# Patient Record
Sex: FEMALE | Race: ASIAN
[De-identification: names, ages, dates, MRNs, and addresses within clinical notes are randomized per-mention and may not be internally consistent; named-entity substitution may affect disease eponyms.]

---

## 2017-04-12 ENCOUNTER — HOSPITAL ENCOUNTER (EMERGENCY)
Dept: HOSPITAL 25 - ED | Age: 22
Discharge: HOME | End: 2017-04-12
Payer: MEDICAID

## 2017-04-12 VITALS — SYSTOLIC BLOOD PRESSURE: 109 MMHG | DIASTOLIC BLOOD PRESSURE: 75 MMHG

## 2017-04-12 DIAGNOSIS — R11.2: ICD-10-CM

## 2017-04-12 DIAGNOSIS — R19.7: ICD-10-CM

## 2017-04-12 DIAGNOSIS — R10.9: Primary | ICD-10-CM

## 2017-04-12 LAB
ALBUMIN SERPL BCG-MCNC: 4.2 G/DL (ref 3.2–5.2)
ALP SERPL-CCNC: 106 U/L (ref 34–104)
ALT SERPL W P-5'-P-CCNC: 25 U/L (ref 7–52)
AMYLASE SERPL-CCNC: 40 U/L (ref 29–103)
ANION GAP SERPL CALC-SCNC: 8 MMOL/L (ref 2–11)
AST SERPL-CCNC: 24 U/L (ref 13–39)
BUN SERPL-MCNC: 7 MG/DL (ref 6–24)
BUN/CREAT SERPL: 13.7 (ref 8–20)
CALCIUM SERPL-MCNC: 9.3 MG/DL (ref 8.6–10.3)
CHLORIDE SERPL-SCNC: 105 MMOL/L (ref 101–111)
GLOBULIN SER CALC-MCNC: 3.8 G/DL (ref 2–4)
GLUCOSE SERPL-MCNC: 106 MG/DL (ref 70–100)
HCO3 SERPL-SCNC: 20 MMOL/L (ref 22–32)
HCT VFR BLD AUTO: 35 % (ref 35–47)
HGB BLD-MCNC: 10.4 G/DL (ref 12–16)
LIPASE SERPL-CCNC: 31 U/L (ref 11–82)
MAGNESIUM SERPL-MCNC: 1.7 MG/DL (ref 1.9–2.7)
MCH RBC QN AUTO: 20 PG (ref 27–31)
MCHC RBC AUTO-ENTMCNC: 30 G/DL (ref 31–36)
MCV RBC AUTO: 67 FL (ref 80–97)
POTASSIUM SERPL-SCNC: 4.2 MMOL/L (ref 3.5–5)
PROT SERPL-MCNC: 8 G/DL (ref 6.4–8.9)
RBC # BLD AUTO: 5.2 10^6/UL (ref 4–5.4)
SODIUM SERPL-SCNC: 133 MMOL/L (ref 133–145)
WBC # BLD AUTO: 13.3 10^3/UL (ref 3.5–10.8)

## 2017-04-12 PROCEDURE — 82150 ASSAY OF AMYLASE: CPT

## 2017-04-12 PROCEDURE — 36415 COLL VENOUS BLD VENIPUNCTURE: CPT

## 2017-04-12 PROCEDURE — 83735 ASSAY OF MAGNESIUM: CPT

## 2017-04-12 PROCEDURE — 99283 EMERGENCY DEPT VISIT LOW MDM: CPT

## 2017-04-12 PROCEDURE — 86140 C-REACTIVE PROTEIN: CPT

## 2017-04-12 PROCEDURE — 85025 COMPLETE CBC W/AUTO DIFF WBC: CPT

## 2017-04-12 PROCEDURE — 86141 C-REACTIVE PROTEIN HS: CPT

## 2017-04-12 PROCEDURE — 74020: CPT

## 2017-04-12 PROCEDURE — 84702 CHORIONIC GONADOTROPIN TEST: CPT

## 2017-04-12 PROCEDURE — 86703 HIV-1/HIV-2 1 RESULT ANTBDY: CPT

## 2017-04-12 PROCEDURE — 81003 URINALYSIS AUTO W/O SCOPE: CPT

## 2017-04-12 PROCEDURE — 83690 ASSAY OF LIPASE: CPT

## 2017-04-12 PROCEDURE — 80053 COMPREHEN METABOLIC PANEL: CPT

## 2017-04-12 NOTE — ED
Richelle CONTRERAS Matthew, scribed for Fer Camargo MD on 04/12/17 at 0854 .





Abdominal Pain/Female





- HPI Summary


HPI Summary: 


A 23 y/o female presents to the ED with sudden, intermittent, diffuse abdominal 

pain since 03:00 this morning. The pain is rated 9/10 in severity and radiates 

to the back. Associated symptoms include vomiting - 1x, nausea, and diarrhea - 

8x. The patient denies fever, chills, recent travel, and recent Abx. The pain 

alleviates after a BM. LNMP - 3/25/17. 














- History of Current Complaint


Chief Complaint: EDAbdPain


Stated Complaint: ABD PAIN


Time Seen by Provider: 04/12/17 08:44


Hx Obtained From: Patient


Pregnant?: No


Onset/Duration: Sudden Onset, Lasting Hours, Still Present


Timing: Constant


Severity Initially: Moderate


Severity Currently: Moderate


Pain Intensity: 9


Pain Scale Used: 0-10 Numeric


Location: Diffuse


Radiates: Yes


Radiates to: Back


Alleviating Factor(s): Bowel Movement


Associated Signs and Symptoms: Positive: Nausea, Vomiting, Diarrhea.  Negative: 

Fever


Allergies/Adverse Reactions: 


 Allergies











Allergy/AdvReac Type Severity Reaction Status Date / Time


 


Diphenhydramine Allergy  Hives Verified 04/12/17 08:38





[From Benadryl]     














PMH/Surg Hx/FS Hx/Imm Hx


Endocrine/Hematology History: Reports: Hx Thyroid Disease


 History: Reports: Hx Kidney Infection - hx nephrotic disorder


Psychiatric History: Reports: Hx Depression, Other Psychiatric Issues/Disorders 

- hx of cutting





- Surgical History


Surgery Procedure, Year, and Place: Appendectomy


Infectious Disease History: No


Infectious Disease History: 


   Denies: Traveled Outside the [place] in Last 30 Days





- Family History


Known Family History: Positive: Cardiac Disease





- Social History


Alcohol Use: None


Substance Use Type: Reports: None


Smoking Status (MU): Never Smoked Tobacco





Review of Systems


Constitutional: Negative


Negative: Fever, Chills


Eyes: Negative


ENT: Negative


Cardiovascular: Negative


Respiratory: Negative


Positive: Abdominal Pain - Diffuse, Vomiting, Diarrhea, Nausea


Genitourinary: Negative


Musculoskeletal: Negative


Skin: Negative


Neurological: Negative


Psychological: Normal


All Other Systems Reviewed And Are Negative: Yes





Physical Exam





- Summary


Physical Exam Summary: 





VITAL SIGNS: Reviewed. 


GENERAL: Patient is an obese female  who is in some distress secondary to pain. 

Patient is not in any acute respiratory distress. 


HEAD AND FACE: Normocephalic and atraumatic. 


EYES: PERRLA, EOMI x 2, No injected conjunctiva.


EARS: Hearing grossly intact. Ear canals and tympanic membranes are WNL.


MOUTH: Oropharynx within normal limits. 


NECK: Supple, trachea is midline, no adenopathy, no JVD.


CHEST: Symmetric, no tenderness at palpation 


LUNGS: Clear to auscultation bilaterally. No wheezing or crackles.


CVS: RRR,, S1 and S2 present, no murmurs or gallops appreciated. 


ABDOMEN: Soft, positive diffuse tenderness. . No signs of distention. Positive 

bowel sounds. No rebound no guarding, and no masses palpated. No abdominal 

bruit or pulsations. 


EXTREMITIES: FROM in all major joints, no edema, no cyanosis or clubbing.


NEURO: Alert and oriented x 3. No acute neurological deficits. Speech is normal.


SKIN: Dry and warm





Triage Information Reviewed: Yes


Vital Signs On Initial Exam: 


 Initial Vitals











Temp Pulse Resp BP Pulse Ox


 


 97.6 F   82   16   137/86   100 


 


 04/12/17 08:39  04/12/17 08:39  04/12/17 08:39  04/12/17 08:39  04/12/17 08:39











Vital Signs Reviewed: Yes





Diagnostics





- Vital Signs


 Vital Signs











  Temp Pulse Resp BP Pulse Ox


 


 04/12/17 08:39  97.6 F  82  16  137/86  100














- Laboratory


Result Diagrams: 


 04/12/17 08:50





 04/12/17 08:50


Lab Statement: Any lab studies that have been ordered have been reviewed, and 

results considered in the medical decision making process.





- Radiology


  ** Abdominal XR


Xray Interpretation: No Acute Changes - IMPRESSION:  NONOBSTRUCTIVE BOWEL GAS 

PATTERN.


Radiology Interpretation Completed By: Radiologist





Abdominal Pain Fem Course/Dx





- Course


Course Of Treatment: A 23 y/o female presents to the ED with sudden, 

intermittent, diffuse abdominal pain since 03:00 this morning. The pain is 

rated 9/10 in severity and radiates to the back. Associated symptoms include 

vomiting - 1x, nausea, and diarrhea - 8x. The patient denies fever, chills, 

recent travel, and recent Abx. The pain alleviates after a BM. LNMP - 3/25/17.  

Blood work shows WBC of 13.3, Hgb of 10.4 no bands, CO2 of 20, glucose of 106, c

-reactive protein of 15.05 , magnesium of 1.7, which she was given PO 

magnesium.  Urine analysis negative. Abd XR shows nonobstructive bowel gas 

pattern. In the ED course, the patient was given IV fluids, Zofran, and Toradol 

for pain. After the patient was hydrate and given the above medications, her 

symptom shave improved. At this point, the patient is no longer having nausea 

and the abdominal pain has subsided. She also reports that while she was here 

she went to the bathroom and had cramping before, but after her cramping 

alleviated. Since the patient is asymptomatic she will be discharged home 

without a CT A/P, because at this point I do not believe it is warranted. The 

patient will be discharged home on Zofran and Bentyl. She will follow-up with 

her PCP. She is hemodynamically stable and A&Ox3.  I discussed all the findings 

and test results with the patient. Patient was instructed to return to the 

emergency room immediately if any of the symptoms return or worsens. Plan of 

care was discussed with the patient and understands and agrees. All questions 

were answered at patient satisfaction.  There were no further complaints or 

concerns.  Lung exam before discharge: CTA B/L. Good air exchange. No wheezing 

or crackles heard. CVS: S1 and S2 present. No murmurs appreciated. Patient is 

alert and oriented x 3. Patient is hemodynamically stable. Patient will be 

discharged home with follow up PCP in the next 2-3 days





- Diagnoses


Differential Diagnosis: Positive: Other - Gastritis, gastroenteitis, Colitis


Provider Diagnoses: 


 Abdominal pain, Nausea & vomiting








Discharge





- Discharge Plan


Condition: Stable


Disposition: HOME


Prescriptions: 


Dicyclomine CAP* [Bentyl CAP*] 10 mg PO TID PRN #10 cap


 PRN Reason: Pain


Ondansetron ODT TAB* [Zofran 4 MG Odt TAB*] 4 mg PO Q6H PRN #10 tab.odt


 PRN Reason: Vomiting


Patient Education Materials:  Dicyclomine (By mouth), Ondansetron (By mouth), 

Acute Nausea and Vomiting (ED), Acute Diarrhea (ED), Abdominal Pain (ED)


Referrals: 


[facility] [place] PHYSICIAN REFERRAL [Outside]


Additional Instructions: 


Please follow-up with your primary care physician in 2 days. 





The documentation as recorded by the Richelle rooney Matthew accurately 

reflects the service I personally performed and the decisions made by me, Fer Camargo MD.

## 2017-04-12 NOTE — RAD
HISTORY:  Abdominal pain



COMPARISONS: CT dated December 26, 2016



VIEWS: Frontal supine and upright views of the abdomen.



FINDINGS: 

BOWEL: There is a nonobstructive bowel gas pattern. There is a moderate amount of stool

within the colon.

CALCULI: There are no abnormal calculi.

BONES AND SOFT TISSUES: There are no osseous abnormalities.

OTHER FINDINGS: The lung bases are clear. There is no subphrenic gas.



IMPRESSION: 

NONOBSTRUCTIVE BOWEL GAS PATTERN.

## 2017-09-21 ENCOUNTER — HOSPITAL ENCOUNTER (EMERGENCY)
Dept: HOSPITAL 25 - ED | Age: 22
Discharge: HOME | End: 2017-09-21
Payer: COMMERCIAL

## 2017-09-21 VITALS — SYSTOLIC BLOOD PRESSURE: 102 MMHG | DIASTOLIC BLOOD PRESSURE: 77 MMHG

## 2017-09-21 DIAGNOSIS — L03.211: ICD-10-CM

## 2017-09-21 DIAGNOSIS — H92.09: ICD-10-CM

## 2017-09-21 DIAGNOSIS — H66.93: Primary | ICD-10-CM

## 2017-09-21 DIAGNOSIS — R11.0: ICD-10-CM

## 2017-09-21 DIAGNOSIS — J32.9: ICD-10-CM

## 2017-09-21 DIAGNOSIS — R50.9: ICD-10-CM

## 2017-09-21 DIAGNOSIS — R05: ICD-10-CM

## 2017-09-21 PROCEDURE — 99282 EMERGENCY DEPT VISIT SF MDM: CPT

## 2017-09-21 NOTE — ED
Karlo CONTRERAS Benjamin, scribed for Isaiah Leavitt MD on 09/21/17 at 2023 .





HPI Febrile Illness





- HPI Summary


HPI Summary: 





23yo female c/o fever at nights for 6 days. Pt reports temp of 101F. Pt also 

reports right sided facial swelling, right sided hearing loss, sinus pressure, 

chest congestion, and cough with yellow phlegm sometimes, and nausea. Denies V/

D. Pt has been taking Tylenol and NyQuil for her symptoms, but they only 

provide temporary relief and symptoms have been recurring the next day. Hx of 

chronic nephrotic syndrome.





- History of Current Complaint


Chief Complaint: EDFever


Hx Obtained From: Patient


Onset/Duration: Started Days Ago - 6 days ago, Still Present


Timing: Intermittent


Initial Severity: Mild


Current Severity: Moderate


Pain Intensity: 7


Pain Scale Used: 0-10 Numeric


Aggravating Factors: Nothing


Alleviating Factors: Nothing


Associated Signs and Symptoms: Cough, Nausea, Swelling - right face





- Allergy/Home Medications


Allergies/Adverse Reactions: 


 Allergies











Allergy/AdvReac Type Severity Reaction Status Date / Time


 


Diphenhydramine Allergy  Hives Verified 09/21/17 19:13





[From Benadryl]     














PMH/Surg Hx/FS Hx/Imm Hx


Endocrine/Hematology History: Reports: Hx Thyroid Disease


 History: Reports: Hx Kidney Infection - hx nephrotic disorder


Psychiatric History: Reports: Hx Depression, Other Psychiatric Issues/Disorders 

- hx of cutting





- Surgical History


Surgery Procedure, Year, and Place: Appendectomy


Infectious Disease History: No


Infectious Disease History: 


   Denies: Traveled Outside the [place] in Last 30 Days





- Family History


Known Family History: Positive: Cardiac Disease


   Negative: Hypertension





- Social History


Occupation: Unemployed


Lives: With Family


Alcohol Use: None


Substance Use Type: Reports: None


Smoking Status (MU): Never Smoked Tobacco





Review of Systems


Positive: Fever


Eyes: Negative


Positive: Ear Ache


Cardiovascular: Negative


Negative: Chest Pain


Positive: Cough.  Negative: Shortness Of Breath


Positive: Nausea.  Negative: Abdominal Pain, Vomiting, Diarrhea


Genitourinary: Negative


Positive: no symptoms reported


Musculoskeletal: Negative


Skin: Negative


Neurological: Negative


Psychological: Normal


All Other Systems Reviewed And Are Negative: Yes





Physical Exam





- Summary


Physical Exam Summary: 








The patient is well-nourished in no acute distress and in no acute pain.





The skin is warm and dry and skin color reflects adequate perfusion. There is 

cellulitis looking lesion in right neck, without any erythema. Area is not hot 

to touch.  





HEENT:  The head is normocephalic and atraumatic. The pupils are equal and 

reactive. The conjunctivae are clear and without drainage.  Nares are patent 

and without drainage.  Mouth reveals moist mucous membranes and the throat is 

without erythema and exudate.  The external ears are intact. The ear canals are 

patent and without drainage. The bilateral tympanic membranes are bulging and 

erythematous. Teeth are intact with no percussion tenderness. 





Neck is supple with full range of motion and non-tender. There are no carotid 

bruits.  There is no neck vein distension. There are some right sided face and 

neck swelling. 





Respiratory: Chest is non-tender.  Lungs are clear to auscultation and breath 

sounds are symmetrical and equal.





Cardiovascular: Hear is tachycardic, but regular rhythm.  There is no murmur or 

rub auscultated.   There is no peripheral edema and pulses are symmetrical and 

equal.





Abdomen: The abdomen is obese, soft and non-tender.  There are normal bowel 

sounds heard in all four quadrants and there is no organomegaly palpated.





Musculoskeletal: There is no back pain noted.  Extremities are non-tender with 

full range of motion.  There is good capillary refill.  There is no peripheral 

edema or calf tenderness elicited.





Neurological: Patient is alert and oriented to person, place and time.  The 

patient has symmetrical motor strength in all four extremities.  Cranial nerves 

are grossly intact. Deep tendon reflexes are symmetrical and equal in all four 

extremities.





Psychiatric: The patient has an appropriate affect and does not exhibit any 

anxiety or depression. 








Triage Information Reviewed: Yes


Vital Signs On Initial Exam: 


 Initial Vitals











Temp Pulse Resp BP Pulse Ox


 


 99.0 F   122   16   102/77   98 


 


 09/21/17 19:08  09/21/17 19:08  09/21/17 19:08  09/21/17 19:08  09/21/17 19:08











Vital Signs Reviewed: Yes





Diagnostics





- Vital Signs


 Vital Signs











  Temp Pulse Resp BP Pulse Ox


 


 09/21/17 19:08  99.0 F  122  16  102/77  98














- Laboratory


Lab Statement: Any lab studies that have been ordered have been reviewed, and 

results considered in the medical decision making process.





Course/Dx





- Course


Course Of Treatment: Reviewed pt's medication list and allergies. Blood 

pressure noted.





- Febrile Illness


Differential Diagnoses: Cellulitis, Other: - dental infection, otitis media, 

bronchitis





- Diagnoses


Provider Diagnoses: 


 Bilateral acute otitis media, Sinusitis, Facial cellulitis








Discharge





- Discharge Plan


Condition: Stable


Disposition: HOME


Prescriptions: 


Amoxicillin/Clavulanate TAB* [Augmentin *] 875 mg PO BID #20 tab


HYDROcodone/ACETAMIN 5-325 MG* [Norco 5-325 TAB*] 1 tab PO Q6H PRN #20 tab MDD 4


 PRN Reason: pain


Patient Education Materials:  Hydrocodone/Acetaminophen (By mouth), Amoxicillin/

Clavulanate Potassium (By mouth), Cellulitis (ED), Sinusitis (ED), Otitis Media 

(ED)


Referrals: 


[facility] [place] PHYSICIAN REFERRAL [Outside]





The documentation as recorded by the Karlo rooney Benjamin accurately reflects 

the service I personally performed and the decisions made by me, Isaiah Leavitt MD.

## 2017-10-03 ENCOUNTER — HOSPITAL ENCOUNTER (EMERGENCY)
Dept: HOSPITAL 25 - ED | Age: 22
Discharge: LEFT BEFORE DISCHARGE | End: 2017-10-03
Payer: COMMERCIAL

## 2017-10-03 VITALS — SYSTOLIC BLOOD PRESSURE: 95 MMHG | DIASTOLIC BLOOD PRESSURE: 79 MMHG

## 2017-10-03 DIAGNOSIS — R21: Primary | ICD-10-CM

## 2017-10-03 DIAGNOSIS — Z53.21: ICD-10-CM

## 2018-10-20 ENCOUNTER — HOSPITAL ENCOUNTER (EMERGENCY)
Dept: HOSPITAL 25 - ED | Age: 23
Discharge: HOME | End: 2018-10-20
Payer: COMMERCIAL

## 2018-10-20 VITALS — DIASTOLIC BLOOD PRESSURE: 74 MMHG | SYSTOLIC BLOOD PRESSURE: 118 MMHG

## 2018-10-20 DIAGNOSIS — N89.8: ICD-10-CM

## 2018-10-20 DIAGNOSIS — N39.0: Primary | ICD-10-CM

## 2018-10-20 LAB — WBC UR QL AUTO: (no result)

## 2018-10-20 PROCEDURE — 87591 N.GONORRHOEAE DNA AMP PROB: CPT

## 2018-10-20 PROCEDURE — 81003 URINALYSIS AUTO W/O SCOPE: CPT

## 2018-10-20 PROCEDURE — 87086 URINE CULTURE/COLONY COUNT: CPT

## 2018-10-20 PROCEDURE — 81015 MICROSCOPIC EXAM OF URINE: CPT

## 2018-10-20 PROCEDURE — 87491 CHLMYD TRACH DNA AMP PROBE: CPT

## 2018-10-20 PROCEDURE — 87510 GARDNER VAG DNA DIR PROBE: CPT

## 2018-10-20 PROCEDURE — 99283 EMERGENCY DEPT VISIT LOW MDM: CPT

## 2018-10-20 PROCEDURE — 87480 CANDIDA DNA DIR PROBE: CPT

## 2018-10-20 PROCEDURE — 87661 TRICHOMONAS VAGINALIS AMPLIF: CPT

## 2018-10-20 NOTE — ED
GI/ HPI





- HPI Summary


HPI Summary: 


23-year-old female presents with vaginal itching for the past couple days.  She 

states she thinks she has a yeast infection.  She states that she tried 

Monistat without relief.  She states that she is also been having dysuria.  She 

states the burning is worst when she pees.  She denies any fevers.  No nausea 

and vomiting.  No diarrhea or constipation.  She states she gets occasional 

pelvic pain.   Denies any chance STDs.  is on nexplanon. 





- History of Current Complaint


Chief Complaint: EDOBProblems


Time Seen by Provider: 10/20/18 18:31


Stated Complaint: VAGINAL PAIN


Pain Intensity: 0





- Allergy/Home Medications


Allergies/Adverse Reactions: 


 Allergies











Allergy/AdvReac Type Severity Reaction Status Date / Time


 


diphenhydramine Allergy  Hives Verified 10/20/18 18:29





[From Benadryl Allergy]     














PMH/Surg Hx/FS Hx/Imm Hx


Endocrine/Hematology History: Reports: Hx Thyroid Disease


Cardiovascular History: 


   Denies: Hx Hypertension


 History: Reports: Hx Kidney Infection - hx nephrotic disorder


Psychiatric History: Reports: Hx Depression, Other Psychiatric Issues/Disorders 

- hx of cutting





- Surgical History


Surgery Procedure, Year, and Place: Appendectomy


Infectious Disease History: No


Infectious Disease History: 


   Denies: Traveled Outside the [place] in Last 30 Days





- Family History


Known Family History: Positive: Cardiac Disease, Diabetes


   Negative: Hypertension





- Social History


Alcohol Use: Occasionally


Substance Use Type: Reports: None


Smoking Status (MU): Never Smoked Tobacco





Review of Systems


Negative: Fever


Negative: Chest Pain


Negative: Shortness Of Breath


Positive: Abdominal Pain


Positive: dysuria, other - vaginal discharge


All Other Systems Reviewed And Are Negative: Yes





Physical Exam


Triage Information Reviewed: Yes


Vital Signs On Initial Exam: 


 Initial Vitals











Temp Pulse Resp BP Pulse Ox


 


 98.2 F   80   16   121/82   100 


 


 10/20/18 18:27  10/20/18 18:27  10/20/18 18:27  10/20/18 18:27  10/20/18 18:27











Vital Signs Reviewed: Yes


Appearance: Positive: Well-Appearing


Skin: Positive: Warm, Dry


Head/Face: Positive: Normal Head/Face Inspection


Eyes: Positive: Normal, Conjunctiva Clear


ENT: Positive: Pharynx normal


Respiratory/Lung Sounds: Positive: Clear to Auscultation, Breath Sounds Present


Cardiovascular: Positive: Normal, RRR


Abdomen Description: Positive: Soft, Other: - tenderness suprapubic.  Negative: 

CVA Tenderness (R), CVA Tenderness (L)


Pelvic Exam: Positive: External Exam Normal, No Cerv. Motion Tender, Discharge 

- white


Musculoskeletal: Positive: Normal


Neurological: Positive: Normal


Psychiatric: Positive: Normal





Diagnostics





- Vital Signs


 Vital Signs











  Temp Pulse Resp BP Pulse Ox


 


 10/20/18 18:27  98.2 F  80  16  121/82  100














- Laboratory


Lab Results: 


 Lab Results











  10/20/18 Range/Units





  19:29 


 


Urine Color  Straw  


 


Urine Appearance  Clear  


 


Urine pH  7.0  (5-9)  


 


Ur Specific Gravity  1.010  (1.010-1.030)  


 


Urine Protein  Negative  (Negative)  


 


Urine Ketones  Negative  (Negative)  


 


Urine Blood  Negative  (Negative)  


 


Urine Nitrate  Negative  (Negative)  


 


Urine Bilirubin  Negative  (Negative)  


 


Urine Urobilinogen  Negative  (Negative)  


 


Ur Leukocyte Esterase  2+ A  (Negative)  


 


Urine WBC (Auto)  Trace(0-5/hpf)  (Absent)  


 


Urine RBC (Auto)  Absent  (Absent)  


 


Ur Squamous Epith Cells  Present A  (Absent)  


 


Urine Bacteria  Absent  (Absent)  


 


Urine Glucose  Negative  (Negative)  











Lab Statement: Any lab studies that have been ordered have been reviewed, and 

results considered in the medical decision making process.





GIGU Course/Dx





- Course


Course Of Treatment: 23-year-old female presents with vaginal itching for the 

past couple days.  She states she thinks she has a yeast infection.  She states 

that she tried Monistat without relief.  She states that she is also been 

having dysuria.  She states the burning is worst when she pees.  She denies any 

fevers.  No nausea and vomiting.  No diarrhea or constipation.  She states she 

gets occasional pelvic pain.   Denies any chance STDs.  On exam has some 

suprapubic tenderness.  Has no cervical motion tenderness.  white discharge is 

present.  Will treat this potential yeast infection with Diflucan.  Will place 

on Macrobid for UTI.  Will wait for final vaginal cultures to make sure was 

just a yeast infection.  Gave dose of Diflucan here.  Patient understands 

agrees with plan.





- Diagnoses


Differential Diagnoses - Female: STD, Urinary Tract Infection, Vaginitis


Provider Diagnoses: 


 UTI (urinary tract infection), Vaginal discharge








Discharge





- Sign-Out/Discharge


Documenting (check all that apply): Patient Departure





- Discharge Plan


Condition: Good


Disposition: HOME


Prescriptions: 


Nitrofurantoin Monohyd/M-Cryst [Macrobid 100 mg Capsule] 100 mg PO BID #13 cap


Patient Education Materials:  Urinary Tract Infection in Women (ED)


Referrals: 


Aubrey Olguin NP [Primary Care Provider] - 


Meir Encinas MD [Medical Doctor] - 


Additional Instructions: 


Take Macrobid twice a day for 7days, first dose given in ED 


drink plenty of fluids


Follow up with gyn


Return to ED if develop any new or worsening symptoms





- Billing Disposition and Condition


Condition: GOOD


Disposition: Home

## 2019-07-25 ENCOUNTER — HOSPITAL ENCOUNTER (EMERGENCY)
Dept: HOSPITAL 25 - ED | Age: 24
Discharge: HOME | End: 2019-07-25
Payer: COMMERCIAL

## 2019-07-25 VITALS — DIASTOLIC BLOOD PRESSURE: 74 MMHG | SYSTOLIC BLOOD PRESSURE: 117 MMHG

## 2019-07-25 DIAGNOSIS — R50.9: Primary | ICD-10-CM

## 2019-07-25 DIAGNOSIS — E07.9: ICD-10-CM

## 2019-07-25 DIAGNOSIS — Z88.8: ICD-10-CM

## 2019-07-25 LAB
ALBUMIN SERPL BCG-MCNC: 3.7 G/DL (ref 3.2–5.2)
ALBUMIN/GLOB SERPL: 0.9 {RATIO} (ref 1–3)
ALP SERPL-CCNC: 82 U/L (ref 34–104)
ALT SERPL W P-5'-P-CCNC: 51 U/L (ref 7–52)
ANION GAP SERPL CALC-SCNC: 5 MMOL/L (ref 2–11)
AST SERPL-CCNC: 59 U/L (ref 13–39)
BASOPHILS # BLD AUTO: 0 10^3/UL (ref 0–0.2)
BUN SERPL-MCNC: 7 MG/DL (ref 6–24)
BUN/CREAT SERPL: 10.9 (ref 8–20)
CALCIUM SERPL-MCNC: 8.9 MG/DL (ref 8.6–10.3)
CHLORIDE SERPL-SCNC: 108 MMOL/L (ref 101–111)
EOSINOPHIL # BLD AUTO: 0.1 10^3/UL (ref 0–0.6)
GLOBULIN SER CALC-MCNC: 4 G/DL (ref 2–4)
GLUCOSE SERPL-MCNC: 112 MG/DL (ref 70–100)
HCO3 SERPL-SCNC: 25 MMOL/L (ref 22–32)
HCT VFR BLD AUTO: 37 % (ref 35–47)
HGB BLD-MCNC: 11.7 G/DL (ref 12–16)
LYMPHOCYTES # BLD AUTO: 2.4 10^3/UL (ref 1–4.8)
MCH RBC QN AUTO: 22 PG (ref 27–31)
MCHC RBC AUTO-ENTMCNC: 32 G/DL (ref 31–36)
MCV RBC AUTO: 68 FL (ref 80–97)
MICROCYTES BLD QL SMEAR: (no result)
MONOCYTES # BLD AUTO: 0.2 10^3/UL (ref 0–0.8)
NEUTROPHILS # BLD AUTO: 1.1 10^3/UL (ref 1.5–7.7)
NRBC # BLD AUTO: 0 10^3/UL
NRBC BLD QL AUTO: 0.1
PLATELET # BLD AUTO: 201 10^3/UL (ref 150–450)
POTASSIUM SERPL-SCNC: 3.9 MMOL/L (ref 3.5–5)
PROT SERPL-MCNC: 7.7 G/DL (ref 6.4–8.9)
RBC # BLD AUTO: 5.43 10^6 /UL (ref 3.7–4.87)
SODIUM SERPL-SCNC: 138 MMOL/L (ref 135–145)
WBC # BLD AUTO: 3.8 10^3/UL (ref 3.5–10.8)

## 2019-07-25 PROCEDURE — 80053 COMPREHEN METABOLIC PANEL: CPT

## 2019-07-25 PROCEDURE — 99282 EMERGENCY DEPT VISIT SF MDM: CPT

## 2019-07-25 PROCEDURE — 87040 BLOOD CULTURE FOR BACTERIA: CPT

## 2019-07-25 PROCEDURE — 36415 COLL VENOUS BLD VENIPUNCTURE: CPT

## 2019-07-25 PROCEDURE — 86618 LYME DISEASE ANTIBODY: CPT

## 2019-07-25 PROCEDURE — 71046 X-RAY EXAM CHEST 2 VIEWS: CPT

## 2019-07-25 PROCEDURE — 85060 BLOOD SMEAR INTERPRETATION: CPT

## 2019-07-25 PROCEDURE — 85025 COMPLETE CBC W/AUTO DIFF WBC: CPT

## 2019-07-25 PROCEDURE — 81003 URINALYSIS AUTO W/O SCOPE: CPT

## 2019-07-25 NOTE — ED
HPI Febrile Illness





- HPI Summary


HPI Summary: 


The patient is a 23 y/o F presenting to [facility] with a chief complaint of fevers 

for the last week that occur only at night. She reports that her symptoms begin 

with chills and a HA, followed by a fever starting around 2000 or 2100, lasting 

throughout the night reaching up to 104F. During the night, she has generalized 

weakness that causes her to be unable to get up. When she wakes up in the 

morning, the fevers and chills have subsided, but there is still a heaviness in 

her head that also dissipates throughout the day. She additionally c/o gum 

swelling, pain, and erythema (possibly secondary to wisdom teeth), and 

decreased appetite. She denies joint swelling or rash. She doesn't think she 

was bit by a tick. Currently, her pain is rated 6/10 in severity. Hx of thyroid 

disease, nephrotic disorder (as child), depression. Nonsmoker, occasional EtOH, 

no substance use. Lives with daughter and  (neither have been recently 

sick).





- History of Current Complaint


Chief Complaint: EDFever


Time Seen by Provider: 07/25/19 20:00


Hx Obtained From: Patient


Onset/Duration: Started Days Ago - one week, Still Present


Timing: Intermittent - episodes occuring only at night, Lasting Days


Initial Severity: Mild


Current Severity: Moderate


Pain Intensity: 6


Pain Scale Used: 0-10 Numeric


Aggravating Factors: Nothing


Alleviating Factors: Nothing


Associated Signs and Symptoms: Headache, Weakness - generalized, Other: - 

POSITIVE: gum swelling, pain, and erythema, decreased appetite; NEGATIVE: joint 

swelling, rash





- Allergy/Home Medications


Allergies/Adverse Reactions: 


 Allergies











Allergy/AdvReac Type Severity Reaction Status Date / Time


 


diphenhydramine Allergy  Hives Verified 07/25/19 19:05





[From Benadryl Allergy]     














PMH/Surg Hx/FS Hx/Imm Hx


Endocrine/Hematology History: Reports: Hx Thyroid Disease


Cardiovascular History: 


   Denies: Hx Hypertension


 History: Reports: Hx Kidney Infection - hx nephrotic disorder


Psychiatric History: Reports: Hx Depression, Other Psychiatric Issues/Disorders 

- hx of cutting





- Surgical History


Surgical History: Yes


Surgery Procedure, Year, and Place: Appendectomy


Infectious Disease History: No


Infectious Disease History: 


   Denies: Traveled Outside the [place] in Last 30 Days





- Family History


Known Family History: Positive: Cardiac Disease, Diabetes


   Negative: Hypertension





- Social History


Occupation: Employed Full-time


Lives: With Family


Alcohol Use: Occasionally


Hx Substance Use: No


Substance Use Type: Reports: None


Hx Tobacco Use: No


Smoking Status (MU): Never Smoked Tobacco





Review of Systems


Positive: Fever - max of 104F, Chills


Positive: Other - pain, swelling, and erythema of the gums


Negative: Cough


Positive: Other - decreased appetite.  Negative: Abdominal Pain


Negative: Edema


Negative: Rash


Positive: Headache, Weakness - secondary to fevers


All Other Systems Reviewed And Are Negative: Yes





Physical Exam





- Summary


Physical Exam Summary: 


Appearance: Well-appearing, Well-nourished, lying in bed comfortable


Skin: Warm, dry, no obvious rash


Eyes: sclera anicteric, no conjunctival pallor


ENT: Fairly mild gingival inflammation, mucous membranes moist


Neck: deferred


Respiratory: No signs of respiratory distress


Cardiovascular: Appears well perfused, pulses are nml


Abdomen: deferred


Musculoskeletal: Moving all 4 extremities without obvious discomfort


Neurological: Awake and alert, mentation is normal, speech is fluent and 

appropriate


Psychiatric: affect is normal, does not appear anxious or depressed


Triage Information Reviewed: Yes


Vital Signs On Initial Exam: 


 Initial Vitals











Temp Pulse Resp BP Pulse Ox


 


 97.9 F   88   18   134/83   100 


 


 07/25/19 19:06  07/25/19 19:06  07/25/19 19:06  07/25/19 19:06  07/25/19 19:06











Vital Signs Reviewed: Yes





Diagnostics





- Vital Signs


 Vital Signs











  Temp Pulse Resp BP Pulse Ox


 


 07/25/19 19:06  97.9 F  88  18  134/83  100














- Laboratory


Result Diagrams: 


 07/25/19 20:30





 07/25/19 20:30


Lab Statement: Any lab studies that have been ordered have been reviewed, and 

results considered in the medical decision making process.





- Radiology


  ** CXR


Radiology Interpretation Completed By: ED Physician, Radiologist


Summary of Radiographic Findings: No acute process. ED physician has reviewed 

this report. Pending official report.





Re-Evaluation





- Re-Evaluation


  ** First Eval


Re-Evaluation Time: 21:30


Comment: I discussed results with the patient. We also discussed plan for 

discharge.





Course/Dx





- Course


Course Of Treatment: The patient is a 23 y/o F presenting to [facility] with a chief 

complaint of fevers for the last week that occur only at night which start with 

chills and HA followed by weakness secondary to the fever. All symptoms resolve 

by morning except for a heaviness in the head which eventually dissipates 

throughout the day. She additionally c/o gum swelling, pain, and erythema, and 

decreased appetite. She denies joint swelling or rash and denies known tick 

bite. Hx of thyroid disease, nephrotic disorder, depression. Upon physical exam

, the patient exhibits fairly mild gingival inflammation. Blood work is within 

normal limits but reveals RBCs of 5.43, Hgb of 11.7, MCV of 68, MCH of 22, RDW 

of 19, glucose of 112, AST of 59, albumin/globulin ratio of 0.9. UA is negative 

for infection. Per ED physician read, CXR reveals no acute process. She is 

diagnosed with fever. She will be discharged home with follow up with Dr. Merlos and rx for Doxycycline. She agrees with this plan.





- Diagnoses


Provider Diagnoses: 


 Fever








Discharge





- Sign-Out/Discharge


Documenting (check all that apply): Patient Departure - Patient will be 

discharged home.


Patient Received Moderate/Deep Sedation with Procedure: No





- Discharge Plan


Condition: Stable


Disposition: HOME


Prescriptions: 


DOXYcycline CAP(*) [DOXYcycline 100MG CAP(*)] 100 mg PO BID #28 cap


Patient Education Materials:  Fever in Adults (ED)


Referrals: 


Chelita TAVARES,Jacobo MONAE [Medical Doctor] - 4 Days (if no better)





- Billing Disposition and Condition


Condition: STABLE


Disposition: Home





- Attestation Statements


Document Initiated by Prieto: Yes


Documenting Scribe: Fallon Whittaker


Provider For Whom Prieto is Documenting (Include Credential): Dr. Dereck Rangel MD


Scribe Attestation: 


Fallon CONTRERAS scribed for Dr. Dereck Rangel MD on 07/26/19 at 1245. 


Scribe Documentation Reviewed: Yes


Provider Attestation: 


The documentation as recorded by the Fallon rooney accurately reflects 

the service I personally performed and the decisions made by me, Dr. Dereck Rangel MD


Status of Scribmarielle Document: Viewed